# Patient Record
Sex: FEMALE | Race: WHITE
[De-identification: names, ages, dates, MRNs, and addresses within clinical notes are randomized per-mention and may not be internally consistent; named-entity substitution may affect disease eponyms.]

---

## 2019-04-20 ENCOUNTER — HOSPITAL ENCOUNTER (INPATIENT)
Dept: HOSPITAL 92 - L&D/OP | Age: 29
LOS: 1 days | Discharge: HOME | End: 2019-04-21
Attending: OBSTETRICS & GYNECOLOGY | Admitting: OBSTETRICS & GYNECOLOGY
Payer: COMMERCIAL

## 2019-04-20 VITALS — BODY MASS INDEX: 33.3 KG/M2

## 2019-04-20 DIAGNOSIS — Z3A.34: ICD-10-CM

## 2019-04-20 DIAGNOSIS — O23.43: ICD-10-CM

## 2019-04-20 LAB
A1 MICROGLOB PLACENTAL VAG QL: (no result)
AMNISURE INTERNAL CONTROL QC: (no result)
CRYSTAL-AUWI FLAG: 0 (ref 0–15)
HBSAG INDEX: 0.33 S/CO (ref 0–0.99)
HEV IGM SER QL: 0 (ref 0–7.99)
HGB BLD-MCNC: 11.8 G/DL (ref 12–16)
HYALINE CASTS #/AREA URNS LPF: (no result) LPF
MCH RBC QN AUTO: 26.4 PG (ref 27–31)
MCV RBC AUTO: 82.2 FL (ref 78–98)
PATHC CAST-AUWI FLAG: 0.4 (ref 0–2.49)
PLATELET # BLD AUTO: 197 THOU/UL (ref 130–400)
RBC # BLD AUTO: 4.48 MILL/UL (ref 4.2–5.4)
RBC UR QL AUTO: (no result) HPF (ref 0–3)
SP GR UR STRIP: 1 (ref 1–1.04)
SPERM-AUWI FLAG: 0 (ref 0–9.9)
SYPHILIS ANTIBODY INDEX: 0.03 S/CO
WBC # BLD AUTO: 20.7 THOU/UL (ref 4.8–10.8)
WBC UR QL AUTO: (no result) HPF (ref 0–3)
YEAST-AUWI FLAG: 0 (ref 0–25)

## 2019-04-20 PROCEDURE — 84112 EVAL AMNIOTIC FLUID PROTEIN: CPT

## 2019-04-20 PROCEDURE — 81015 MICROSCOPIC EXAM OF URINE: CPT

## 2019-04-20 PROCEDURE — 51701 INSERT BLADDER CATHETER: CPT

## 2019-04-20 PROCEDURE — 36415 COLL VENOUS BLD VENIPUNCTURE: CPT

## 2019-04-20 PROCEDURE — 86850 RBC ANTIBODY SCREEN: CPT

## 2019-04-20 PROCEDURE — 81003 URINALYSIS AUTO W/O SCOPE: CPT

## 2019-04-20 PROCEDURE — 86900 BLOOD TYPING SEROLOGIC ABO: CPT

## 2019-04-20 PROCEDURE — 86780 TREPONEMA PALLIDUM: CPT

## 2019-04-20 PROCEDURE — 76805 OB US >/= 14 WKS SNGL FETUS: CPT

## 2019-04-20 PROCEDURE — 99285 EMERGENCY DEPT VISIT HI MDM: CPT

## 2019-04-20 PROCEDURE — 86901 BLOOD TYPING SEROLOGIC RH(D): CPT

## 2019-04-20 PROCEDURE — 87086 URINE CULTURE/COLONY COUNT: CPT

## 2019-04-20 PROCEDURE — 87340 HEPATITIS B SURFACE AG IA: CPT

## 2019-04-20 PROCEDURE — 85027 COMPLETE CBC AUTOMATED: CPT

## 2019-04-20 NOTE — ULT
HISTORY: 

Premature rupture of membranes. Evaluate size and dates.

 

Multiple longitudinal and transverse images of an intrauterine pregnancy is obtained using multihertz
 curvilinear transducer. Real time, color flow, and spectral waveform Doppler analysis used to evalua
te the fetus.

 

Images demonstrate a viable intrauterine pregnancy with the fetus in a cephalic presentation. Cardiac
 activity measures 141 beats per minute. Amniotic fluid  index is markedly reduced measuring 2.3 cm. 
Fetal anatomic evaluation did not include measurement of head circumference as the head had already b
een engaged and there is significant molding of the head. 

 

Abdominal circumference:  299 mm giving a gestational age of 33 weeks, 6 days. 

Femur length measures  66 mm equals 33 weeks, 6 days. 

Head circumference measures 267 mm equals 29 weeks, 1 day.

 

Estimated gestational age is 32 weeks, 2 days with an estimated date of delivery of 6/13/19. Estimate
d fetal weight is 2340 grams plus or minus 374 grams. 

 

IMPRESSION: 

Oligohydramnios. Cardiac activity is confirmed. 

 

POS: [facility]

## 2019-04-20 NOTE — PDOC.FPROB
FMR OB H&P: HPI





- History of Present Illness


Chief Complaint: Urine leaking


Indentification: 27 yo  @ 34.3 weeks 


History of Present Illness: 





27 yo  @ 34.3 weeks comes in w/ c/c of leakage of urine. Reports waking up 

at 3:00 in the morning with large pool of what she thought was urine. Continued 

to have leakage. Went to [facility] where they told her she had a UTI 

and gave her abx. She continued to have leakage of urine yesterday and 

throughout today. Reports having some lower abdominal pain. Denies any fever or 

chills. 


Primary Care Physician: 





Linda





FMR OB H&P: Current Pregnancy





- Prenatal Care


: 1


Para: 0


Gestational age: 34.3 





- OB Labs


Blood type: unknown


RH: unknown


Antibody Screen: unknown


HIV: unknown


HepBsAg: unknown


Quad screen: unknown


Gonorrhea: unknown


Chlamydia: unknown


GBS: unknown





FMR OB H&P: History





- Past Medical History


PMH: 





None





- OB History


OB History: 





Had Placenta Previa early in Pregnancy which has since resolved





- GYN History


GYN History: 





None





- Surgical History


Sx History: 





None





- Social History


Social History: 





Denies any smoking, alcohol or illicit drug use





- Family History


Family History: 





Insignificant





FMR OB H&P: Medications





- Current


Allergies/Adverse Reactions: 


 Allergies











Allergy/AdvReac Type Severity Reaction Status Date / Time


 


No Known Allergies Allergy   Unverified 19 18:04














FMR OB H&P: ROS





- Review of Systems


General: denies: fever/chills, fatigue


Eyes: denies: vision changes


ENT: denies: nasal congestion


Respiratory: denies: cough, congestion, shortness of breath


Gastrointestinal: reports: abdominal pain, nausea.  denies: cramping, vomiting, 

diarrhea, constipation


Genitourinary (Female): reports: incontinence, vaginal discharge.  denies: 

vaginal pain, vaginal bleeding, contractions


Musculoskeletal: denies: pain, stiffness


Integumentary: denies: itching, rash


Hematologic/Lymphatic: denies: prolonged or excessive bleeding


Psychological: denies: depression, anxiety





FMR OB H&P: Vital Signs





- Maternal


Vital signs: 





/72 P 112 Po2 96% on RA T98.3 F





- Fetal Heart Tones


Baseline: 140


Variability: moderate


Acceleration: present


Deceleration: absent


Category: category 1


Kerens contractions every: Irregular





FMR OB H&P: Physical Exam





- Physical Exam


General: NAD, awake, alert and oriented


HEENT: normocephalic and atraumatic


Neck: supple


Heart: RRR, normal S1/S2, no murmurs/rubs/gallops


General: CTAB, no respiratory distress, good air movement


Abdomen: soft, gravid, non-tender, bowel sound present


Musculoskeletal: normal gait and station, pulses present


Neurological: sensation to pain,touch and proprioception grossly normal


Skin: no rash, capillary refill <2 seconds


Psychiatric: intact recent and remote memory (x)





- Pelvic Exam


Fetal Presentation: Vertex





FMR OB H&P: Results





- Labs


Lab results: 


 Laboratory Results - last 24 hr











  19





  18:21 18:27


 


Urine Color   YELLOW


 


Urine Clarity   CLOUDY


 


Urine pH   6.5


 


Ur Specific Gravity   1.004


 


Urine Protein   Negative


 


Urine Glucose (UA)   Negative


 


Urine Ketones   15 H


 


Urine Blood   Large H


 


Urine Nitrite   Negative


 


Urine Bilirubin   Negative


 


Urine Urobilinogen   1.0


 


Ur Leukocyte Esterase   Large H


 


Urine RBC   4-6


 


Urine WBC   21-50 H


 


Ur Squamous Epith Cells   0-3


 


Urine Bacteria   None Seen


 


Hyaline Casts   0-3 HYALINE CAST


 


Amnio Swab Test  RUPTURE DETECTED H 














FMR OB H&P: A/P





- Problem List


(1)  premature rupture of membranes


Current Visit: Yes   Status: Acute   Code(s): O42.919 - PRETRM BHANU ROM, UNSP 

TIME BETW RUPT AND ONST LABR, UNSP TRI   





(2) UTI (urinary tract infection)


Current Visit: Yes   Status: Acute   


Disposition: 





27 yo  @ 34.3 weeks here with  Rupture of membranes


-We will administer betamethasone. 


-We will administer Ampicillin 2g q6hrs and Azithromycin 500mgg today and 250mg 

daily. 


-Will get ultrasound assessing presentation, growth and BLANCO. 


-Currently the NICU is full. 





UTI


-Pt U/A LE+ and WBC+. 


-She is currently getting abx tx per above. 


-She has a culture from yesterday that is preliminary


Discussion: 


Date/Time: 19











This H&P was discussed with  [] and  [] who agree with the above 

documentation and plan.








Addendum - Attending





- Attending Attestation


Date/Time: 19





I personally evaluated the patient and discussed the management with Dr. Donahue.


27 yo WF G1 at 34 weeks c/o LOF since yesterday. Amnisure is positive.


USG confirms decreased BLANCO and vtx presentation. Steroids and ABX for latency 

ordered.


Dr. Mckeon discussing transfer status with Dr. Carrasco.


I agree with the History, Examination, Assessment and Plan documented above.

## 2019-04-21 VITALS — SYSTOLIC BLOOD PRESSURE: 102 MMHG | TEMPERATURE: 97.7 F | DIASTOLIC BLOOD PRESSURE: 57 MMHG

## 2019-04-21 NOTE — PDOC.PP
Post Partum Progress Note


Post Partum Day #: 1


PO intake tolerated: yes


Flatus: yes


Ambulation: yes


 Vital Signs (12 hours)











  Temp Pulse Resp BP Pulse Ox


 


 04/21/19 08:00  97.7 F  82  20  102/57 L  97


 


 04/21/19 04:39  97.8 F  91  16  99/57 L 


 


 04/21/19 02:45  98.6 F  101 H  18  105/57 L 


 


 04/21/19 01:40  98.2 F  110 H  18  108/52 L  98








 Weight











Weight                         194 lb

















- Physical Examination


General: NAD


Cardiovascular: no m/r/g


Respiratory: clear to auscultation bilaterally, non-labored breathing


Abdominal: lochia, no distention


Extremities: negative homans (B)


Neurological: no gross focal deficits


Psychiatric: A&Ox3, normal affect (Pt requesting DC so she can travel to 

[place] where her baby was transferred.)


Result Diagrams: 


 04/20/19 19:44





Additional Labs: 


 Post Partum Labs











Blood Type  B POSITIVE   04/20/19  21:43    


 


Hep Bs Antigen  Non-Reactive S/CO (NonReactive)   04/20/19  19:44

## 2019-04-22 NOTE — DN
DATE OF PROCEDURE:  2019



Ms. Posey presented to L&D and was initially evaluated by Dr. Osman and his 
team.  She was found to be ruptured.  She was then found to be 6 cm,

90%, and -1 station on exam in active labor.  It was unsafe to transfer this 
patient and she was

delivered at [facility].



PREOPERATIVE DIAGNOSIS:  Intrauterine pregnancy at 34 weeks and 3 days with 


labor with spontaneous rupture of membranes at 34 weeks and 3 days. 



POSTOPERATIVE DIAGNOSIS:  Intrauterine pregnancy at 34 weeks and 3 days with 


labor with spontaneous rupture of membranes at 34 weeks and 3 days. 



PROCEDURE PERFORMED:  Spontaneous vaginal delivery over a first-degree 
laceration of

the perineum with epidural anesthesia in place. 



FINDINGS:  Viable female infant, weighing 1905 g or 4 pounds 3 ounces.  Apgars 
8 and

9. 



QUANTITATIVE BLOOD LOSS:  100 mL.



COMPLICATIONS:  None.



PROCEDURE IN DETAIL:  The patient presented to [facility]

where she was admitted to the labor and delivery service.  The patient 
underwent a

normal and uneventful labor with normal cervical dilatation until she was found 
to

be completely dilated.  She was then allowed to push and was able to bring the 
baby

down and delivered the baby in a vertex presentation without difficulties.  
Once the

head delivered in occiput anterior position, the shoulders followed 
spontaneously

along with the rest of the baby's body.  Once out the baby's mouth and nose were

bulb suctioned.  The cord was clamped and cut and baby was handed to waiting

attendants.  Cord blood was collected.  Gentle fundal massage was performed and 
the

placenta delivered intact without problems.  Hemostasis was assured.  
Quantitative

blood loss was calculated.  Inspection of the cervix, vaginal vault, and 
perineum

did not reveal any lacerations needing suturing.  Once again, hemostasis was 
within

normal limits and the patient was allowed to recover in the labor and delivery 
room.

 Baby went to  nursery.







Job ID:  [id number]



[facility]